# Patient Record
Sex: MALE | Race: ASIAN | URBAN - METROPOLITAN AREA
[De-identification: names, ages, dates, MRNs, and addresses within clinical notes are randomized per-mention and may not be internally consistent; named-entity substitution may affect disease eponyms.]

---

## 2017-02-02 ENCOUNTER — HOSPITAL ENCOUNTER (EMERGENCY)
Facility: CLINIC | Age: 32
Discharge: HOME | End: 2017-02-02
Attending: EMERGENCY MEDICINE | Admitting: SURGERY

## 2017-02-02 ENCOUNTER — OFFICE VISIT (OUTPATIENT)
Dept: URGENT CARE | Facility: URGENT CARE | Age: 32
End: 2017-02-02

## 2017-02-02 ENCOUNTER — HOSPITAL ENCOUNTER (OUTPATIENT)
Dept: CT IMAGING | Facility: CLINIC | Age: 32
Discharge: HOME | End: 2017-02-02
Attending: NURSE PRACTITIONER | Admitting: NURSE PRACTITIONER

## 2017-02-02 ENCOUNTER — OFFICE VISIT (OUTPATIENT)
Dept: PEDIATRICS | Facility: CLINIC | Age: 32
End: 2017-02-02

## 2017-02-02 VITALS
WEIGHT: 178 LBS | TEMPERATURE: 97.9 F | BODY MASS INDEX: 28.61 KG/M2 | SYSTOLIC BLOOD PRESSURE: 104 MMHG | DIASTOLIC BLOOD PRESSURE: 64 MMHG | HEIGHT: 66 IN | OXYGEN SATURATION: 98 % | HEART RATE: 81 BPM

## 2017-02-02 VITALS
HEART RATE: 75 BPM | SYSTOLIC BLOOD PRESSURE: 100 MMHG | HEIGHT: 68 IN | TEMPERATURE: 98.5 F | DIASTOLIC BLOOD PRESSURE: 56 MMHG | OXYGEN SATURATION: 98 % | BODY MASS INDEX: 27.68 KG/M2 | WEIGHT: 182.6 LBS

## 2017-02-02 VITALS
HEART RATE: 82 BPM | OXYGEN SATURATION: 100 % | SYSTOLIC BLOOD PRESSURE: 107 MMHG | TEMPERATURE: 98 F | RESPIRATION RATE: 16 BRPM | DIASTOLIC BLOOD PRESSURE: 71 MMHG

## 2017-02-02 DIAGNOSIS — R10.33 PERIUMBILICAL ABDOMINAL PAIN: Primary | ICD-10-CM

## 2017-02-02 DIAGNOSIS — K57.12 DIVERTICULITIS OF SMALL INTESTINE WITHOUT PERFORATION OR ABSCESS WITHOUT BLEEDING: ICD-10-CM

## 2017-02-02 DIAGNOSIS — R10.84 ABDOMINAL PAIN, GENERALIZED: ICD-10-CM

## 2017-02-02 DIAGNOSIS — R10.84 ABDOMINAL PAIN, GENERALIZED: Primary | ICD-10-CM

## 2017-02-02 LAB
ALBUMIN SERPL-MCNC: 4 G/DL (ref 3.4–5)
ALBUMIN UR-MCNC: NEGATIVE MG/DL
ALP SERPL-CCNC: 57 U/L (ref 40–150)
ALT SERPL W P-5'-P-CCNC: 43 U/L (ref 0–70)
ANION GAP SERPL CALCULATED.3IONS-SCNC: 2 MMOL/L (ref 3–14)
APPEARANCE UR: CLEAR
AST SERPL W P-5'-P-CCNC: 27 U/L (ref 0–45)
BASOPHILS # BLD AUTO: 0 10E9/L (ref 0–0.2)
BASOPHILS NFR BLD AUTO: 0.2 %
BILIRUB SERPL-MCNC: 1.4 MG/DL (ref 0.2–1.3)
BILIRUB UR QL STRIP: NEGATIVE
BUN SERPL-MCNC: 10 MG/DL (ref 7–30)
CALCIUM SERPL-MCNC: 9.6 MG/DL (ref 8.5–10.1)
CHLORIDE SERPL-SCNC: 108 MMOL/L (ref 94–109)
CO2 SERPL-SCNC: 30 MMOL/L (ref 20–32)
COLOR UR AUTO: YELLOW
CREAT SERPL-MCNC: 1 MG/DL (ref 0.66–1.25)
DIFFERENTIAL METHOD BLD: ABNORMAL
EOSINOPHIL # BLD AUTO: 0.1 10E9/L (ref 0–0.7)
EOSINOPHIL NFR BLD AUTO: 0.6 %
ERYTHROCYTE [DISTWIDTH] IN BLOOD BY AUTOMATED COUNT: 11.9 % (ref 10–15)
ERYTHROCYTE [SEDIMENTATION RATE] IN BLOOD BY WESTERGREN METHOD: 12 MM/H (ref 0–15)
GFR SERPL CREATININE-BSD FRML MDRD: 87 ML/MIN/1.7M2
GLUCOSE SERPL-MCNC: 128 MG/DL (ref 70–99)
GLUCOSE UR STRIP-MCNC: NEGATIVE MG/DL
HCT VFR BLD AUTO: 45.2 % (ref 40–53)
HGB BLD-MCNC: 14.8 G/DL (ref 13.3–17.7)
HGB UR QL STRIP: NEGATIVE
KETONES UR STRIP-MCNC: NEGATIVE MG/DL
LEUKOCYTE ESTERASE UR QL STRIP: NEGATIVE
LIPASE SERPL-CCNC: 47 U/L (ref 73–393)
LYMPHOCYTES # BLD AUTO: 1.7 10E9/L (ref 0.8–5.3)
LYMPHOCYTES NFR BLD AUTO: 13.6 %
MCH RBC QN AUTO: 29.6 PG (ref 26.5–33)
MCHC RBC AUTO-ENTMCNC: 32.7 G/DL (ref 31.5–36.5)
MCV RBC AUTO: 90 FL (ref 78–100)
MONOCYTES # BLD AUTO: 1.3 10E9/L (ref 0–1.3)
MONOCYTES NFR BLD AUTO: 10.4 %
NEUTROPHILS # BLD AUTO: 9.2 10E9/L (ref 1.6–8.3)
NEUTROPHILS NFR BLD AUTO: 75.2 %
NITRATE UR QL: NEGATIVE
PH UR STRIP: 6 PH (ref 5–7)
PLATELET # BLD AUTO: 256 10E9/L (ref 150–450)
POTASSIUM SERPL-SCNC: 4.2 MMOL/L (ref 3.4–5.3)
PROT SERPL-MCNC: 8.3 G/DL (ref 6.8–8.8)
RBC # BLD AUTO: 5 10E12/L (ref 4.4–5.9)
SODIUM SERPL-SCNC: 140 MMOL/L (ref 133–144)
SP GR UR STRIP: 1.02 (ref 1–1.03)
URN SPEC COLLECT METH UR: NORMAL
UROBILINOGEN UR STRIP-ACNC: 0.2 EU/DL (ref 0.2–1)
WBC # BLD AUTO: 12.3 10E9/L (ref 4–11)

## 2017-02-02 PROCEDURE — 96365 THER/PROPH/DIAG IV INF INIT: CPT

## 2017-02-02 PROCEDURE — 99214 OFFICE O/P EST MOD 30 MIN: CPT | Performed by: NURSE PRACTITIONER

## 2017-02-02 PROCEDURE — 25000128 H RX IP 250 OP 636: Performed by: EMERGENCY MEDICINE

## 2017-02-02 PROCEDURE — 25500064 ZZH RX 255 OP 636: Performed by: RADIOLOGY

## 2017-02-02 PROCEDURE — 74177 CT ABD & PELVIS W/CONTRAST: CPT

## 2017-02-02 PROCEDURE — 83690 ASSAY OF LIPASE: CPT | Performed by: NURSE PRACTITIONER

## 2017-02-02 PROCEDURE — 99212 OFFICE O/P EST SF 10 MIN: CPT

## 2017-02-02 PROCEDURE — 85652 RBC SED RATE AUTOMATED: CPT | Performed by: NURSE PRACTITIONER

## 2017-02-02 PROCEDURE — 81003 URINALYSIS AUTO W/O SCOPE: CPT | Performed by: NURSE PRACTITIONER

## 2017-02-02 PROCEDURE — 36415 COLL VENOUS BLD VENIPUNCTURE: CPT | Performed by: NURSE PRACTITIONER

## 2017-02-02 PROCEDURE — 99284 EMERGENCY DEPT VISIT MOD MDM: CPT | Mod: 25

## 2017-02-02 PROCEDURE — 80053 COMPREHEN METABOLIC PANEL: CPT | Performed by: NURSE PRACTITIONER

## 2017-02-02 PROCEDURE — 25000128 H RX IP 250 OP 636: Performed by: RADIOLOGY

## 2017-02-02 PROCEDURE — 85025 COMPLETE CBC W/AUTO DIFF WBC: CPT | Performed by: NURSE PRACTITIONER

## 2017-02-02 PROCEDURE — 96375 TX/PRO/DX INJ NEW DRUG ADDON: CPT

## 2017-02-02 RX ORDER — KETOROLAC TROMETHAMINE 30 MG/ML
30 INJECTION, SOLUTION INTRAMUSCULAR; INTRAVENOUS ONCE
Status: COMPLETED | OUTPATIENT
Start: 2017-02-02 | End: 2017-02-02

## 2017-02-02 RX ORDER — TRAMADOL HYDROCHLORIDE 50 MG/1
50 TABLET ORAL EVERY 6 HOURS PRN
Qty: 20 TABLET | Refills: 0 | Status: SHIPPED | OUTPATIENT
Start: 2017-02-02

## 2017-02-02 RX ORDER — ERTAPENEM 1 G/1
1 INJECTION, POWDER, LYOPHILIZED, FOR SOLUTION INTRAMUSCULAR; INTRAVENOUS ONCE
Status: COMPLETED | OUTPATIENT
Start: 2017-02-02 | End: 2017-02-02

## 2017-02-02 RX ORDER — IBUPROFEN 600 MG/1
600 TABLET, FILM COATED ORAL EVERY 6 HOURS PRN
Qty: 30 TABLET | Refills: 1 | Status: SHIPPED | OUTPATIENT
Start: 2017-02-02 | End: 2017-02-02

## 2017-02-02 RX ORDER — IOPAMIDOL 755 MG/ML
500 INJECTION, SOLUTION INTRAVASCULAR ONCE
Status: COMPLETED | OUTPATIENT
Start: 2017-02-02 | End: 2017-02-02

## 2017-02-02 RX ORDER — IBUPROFEN 600 MG/1
600 TABLET, FILM COATED ORAL EVERY 6 HOURS PRN
Qty: 30 TABLET | Refills: 1 | Status: SHIPPED | OUTPATIENT
Start: 2017-02-02

## 2017-02-02 RX ORDER — SODIUM CHLORIDE 9 MG/ML
1000 INJECTION, SOLUTION INTRAVENOUS CONTINUOUS
Status: DISCONTINUED | OUTPATIENT
Start: 2017-02-02 | End: 2017-02-02 | Stop reason: HOSPADM

## 2017-02-02 RX ORDER — LIDOCAINE 40 MG/G
CREAM TOPICAL
Status: DISCONTINUED | OUTPATIENT
Start: 2017-02-02 | End: 2017-02-02 | Stop reason: HOSPADM

## 2017-02-02 RX ADMIN — KETOROLAC TROMETHAMINE 30 MG: 30 INJECTION, SOLUTION INTRAMUSCULAR at 16:34

## 2017-02-02 RX ADMIN — ERTAPENEM SODIUM 1 G: 1 INJECTION, POWDER, LYOPHILIZED, FOR SOLUTION INTRAMUSCULAR; INTRAVENOUS at 16:31

## 2017-02-02 RX ADMIN — IOPAMIDOL 92 ML: 755 INJECTION, SOLUTION INTRAVENOUS at 14:37

## 2017-02-02 RX ADMIN — SODIUM CHLORIDE 64 ML: 9 INJECTION, SOLUTION INTRAVENOUS at 14:37

## 2017-02-02 ASSESSMENT — ENCOUNTER SYMPTOMS
ABDOMINAL PAIN: 1
DIARRHEA: 0

## 2017-02-02 NOTE — ED AVS SNAPSHOT
[facility] Emergency Department    [address]    [facility] [address]    Phone:  [phone]    Fax:  [phone]                                       Lalo Fulton   MRN: [id number]    Department:  [facility] Emergency Department   Date of Visit:  2/2/2017           Patient Information     Date Of Birth          1985        Your diagnoses for this visit were:     Diverticulitis of small intestine without perforation or abscess without bleeding        You were seen by Merrill Ann MD.      Follow-up Information     Follow up with Nikolas Melgoza MD. Schedule an appointment as soon as possible for a visit in 3 days.    Specialty:  General Surgery    Why:  As needed    Contact information:    [facility]  [address]  [facility] [address]  [phone]          Discharge Instructions       We    We have discussed the fact that your CT scan shows inflammation at the base of the right colon. This is the area of the appendix, but we are not sure if this is your appendix or not. You have elected not to go through surgery, but if the pain gets worse you may have to.    Please return to the ER with fever greater than 100.4, or sevre increase in pain.    Please follow up with Dr. Nikolas Melgoza on Monday or Tuesday.    Diverticulitis    Some people get pouches along the wall of the colon as they get older. The pouches, called diverticuli, usually cause no symptoms. If the pouches become blocked, you can get an infection. This infection is called diverticulitis. It causes pain in your lower abdomen and fever. If not treated, it can become a serious condition, causing an abscess to form inside the pouch. The abscess may block the intestinal tract even or rupture, spreading infection throughout the abdomen.  When treatment is started early, oral antibiotics alone may be enough to cure diverticulitis. This method is tried first. But, if you  don't improve or if your condition gets worse while you are trying oral antibiotics, you may need to be admitted to the hospital for IV antibiotics. Severe cases may require surgery.  Home care  The following guidelines will help you care for yourself at home:    During the acute illness, rest and follow a low-fiber diet. Include foods like:    Flake cereal, mashed potatoes, pancakes, waffles, pasta, white bread, rice, applesauce, bananas, eggs, meat, fish, poultry, tofu, and cooked vegetables    Take antibiotics exactly as the doctor says. Don't miss any doses or stop taking the medication, even if you feel better.    Monitor your temperature and report any rising temperatures to your doctor.  Preventing future attacks  Once you have had an episode of diverticulitis, you are at risk for having it again. After you have recovered from this episode, you may be able to reduce your risk by eating a high-fiber diet (20-35 gm/day of fiber). This cleans out the colon pouches that already exist and prevents new ones from forming. Foods high in fiber include fresh fruits and edible peelings, raw or lightly cooked vegetables, whole grain cereals and breads, dried beans and peas, and bran.  Other steps that can help prevent future attacks include:    Take your medications, such as antibiotics, as the doctor says.    Drink 6 to 8 glasses of water every day, unless directed otherwise.    Use a heating pad or hot water bottle to reduce abdominal cramping or pain.    Begin an exercise program. Ask your doctor how to get started. You can benefit from simple activities such as walking or gardening.    Treat diarrhea with a bland diet. Start with liquids only; then slowly add fiber over time.    Watch for changes in your bowel movements (constipation to diarrhea).    Get plenty of rest and sleep.  Follow-up care  Follow up with your doctor as advised or sooner if you are not getting better in the next 2 days.  When to seek medical  care  Get prompt medical attention if any of the following occur:    Fever of 100.4 F (38 C) or higher, or as directed by your health care provider    Repeated vomiting or swelling of the abdomen    Weakness, dizziness, light-headedness    Pain in your abdomen that gets worse, severe, or spreads to your back    Pain that moves to the right lower abdomen    Rectal bleeding (stools that are red, black or maroon color)    Unexpected vaginal bleeding    9085-7441 [employer]. [address] [place] [address]. All rights reserved. This information is not intended as a substitute for professional medical care. Always follow your healthcare professional's instructions.          24 Hour Appointment Hotline       To make an appointment at any [facility] clinic, call [phone] ([phone]). If you don't have a family doctor or clinic, we will help you find one. [facility] clinics are conveniently located to serve the needs of you and your family.             Review of your medicines      START taking        Dose / Directions Last dose taken    amoxicillin-clavulanate 875-125 MG per tablet   Commonly known as:  AUGMENTIN   Dose:  1 tablet   Quantity:  20 tablet        Take 1 tablet by mouth 2 times daily   Refills:  0        ibuprofen 600 MG tablet   Commonly known as:  ADVIL/MOTRIN   Dose:  600 mg   Quantity:  30 tablet        Take 1 tablet (600 mg) by mouth every 6 hours as needed for moderate pain   Refills:  1        traMADol 50 MG tablet   Commonly known as:  ULTRAM   Dose:  50 mg   Quantity:  20 tablet        Take 1 tablet (50 mg) by mouth every 6 hours as needed for pain maximum 6 tablet(s) per day   Refills:  0                Prescriptions were sent or printed at these locations (3 Prescriptions)                   [facility] [address] [address] [address]    Telephone:  [phone]   Fax:  [phone]   Hours:                   Printed at Department/Unit printer (1 of 3)         traMADol (ULTRAM) 50 MG tablet                 Printed at [facility] (2 of 3)         amoxicillin-clavulanate (AUGMENTIN) 875-125 MG per tablet               ibuprofen (ADVIL/MOTRIN) 600 MG tablet                Procedures and tests performed during your visit     Peripheral IV: Standard      Orders Needing Specimen Collection     None      Pending Results     Date and Time Order Name Status Description    2/2/2017 1103 LIPASE In process             Pending Culture Results     No orders found from 2/1/2017 to 2/3/2017.             Test Results from your hospital stay            Clinical Quality Measure: Blood Pressure Screening     Your blood pressure was checked while you were in the emergency department today. The last reading we obtained was  BP: 103/67 mmHg . Please read the guidelines below about what these numbers mean and what you should do about them.  If your systolic blood pressure (the top number) is less than 120 and your diastolic blood pressure (the bottom number) is less than 80, then your blood pressure is normal. There is nothing more that you need to do about it.  If your systolic blood pressure (the top number) is 120-139 or your diastolic blood pressure (the bottom number) is 80-89, your blood pressure may be higher than it should be. You should have your blood pressure rechecked within a year by a primary care provider.  If your systolic blood pressure (the top number) is 140 or greater or your diastolic blood pressure (the bottom number) is 90 or greater, you may have high blood pressure. High blood pressure is treatable, but if left untreated over time it can put you at risk for heart attack, stroke, or kidney failure. You should have your blood pressure rechecked by a primary care provider within the next 4 weeks.  If your provider in the emergency department today gave you specific instructions to  "follow-up with your doctor or provider even sooner than that, you should follow that instruction and not wait for up to 4 weeks for your follow-up visit.        Thank you for choosing [facility]       Thank you for choosing [facility] for your care. Our goal is always to provide you with excellent care. Hearing back from our patients is one way we can continue to improve our services. Please take a few minutes to complete the written survey that you may receive in the mail after you visit with us. Thank you!        [employer] Information     [employer] lets you send messages to your doctor, view your test results, renew your prescriptions, schedule appointments and more. To sign up, go to [email or web address] . Click on \"Log in\" on the left side of the screen, which will take you to the Welcome page. Then click on \"Sign up Now\" on the right side of the page.     You will be asked to enter the access code listed below, as well as some personal information. Please follow the directions to create your username and password.     Your access code is: [email or web address]  Expires: 5/3/2017  5:08 PM     Your access code will  in 90 days. If you need help or a new code, please call your [facility] clinic or [phone].        Care EveryWhere ID     This is your Care EveryWhere ID. This could be used by other organizations to access your [facility] medical records  [id number]        After Visit Summary       This is your record. Keep this with you and show to your community pharmacist(s) and doctor(s) at your next visit.                  "

## 2017-02-02 NOTE — PATIENT INSTRUCTIONS
"   * ABDOMINAL PAIN, POSSIBLE APPENDICITIS, Repeat Exam, Male    Based on your visit today, the exact cause of your abdominal (stomach) pain is not certain. However, you do have some of the early signs of appendicitis. Early in an appendix infection the symptoms can be similar to a simple \"stomach ache\" or \"stomach flu\". Therefore, the diagnosis can be hard to make. Since an appendix infection is a serious condition, it is important to know if this is the cause of your symptoms.  WAITING for more time to pass and repeating the exam is the best way to find out whether you have appendicitis. Within the next 12-24 hours the cause of your stomach pain should become clear. It is important for you to watch for any new symptoms or worsening of your condition. (See below).  HOME CARE:  1. Rest until your next exam. No strenuous activities.  2. Eat a diet low in fiber (called a low-residue diet). Foods allowed include refined breads, white rice, fruit and vegetable juices without pulp, tender meats. These foods will pass more easily through the intestine.  3. Avoid whole-grain foods, whole fruits and vegetables, meats, seeds and nuts, fried or fatty foods, dairy, alcohol and spicy foods until your symptoms go away.  In some cases, you may be asked not to eat or drink anything until you are re-examined.  4. Return for another exam exactly as directed.  FOLLOW UP with your doctor or this facility as directed.  RETURN PROMPTLY before your next appointment or contact your doctor if any of the following occur:    Pain gets worse or moves to the right lower abdomen    New or worsening vomiting or diarrhea    Swelling of the abdomen    Unable to pass stool for more than three days    New fever over 100.4  F (38  C), or rising fever    Blood in vomit or bowel movements (dark red or black color)    Weakness, dizziness or fainting    [id number] [employer], [address] [place] [address], [place] [address]. All rights reserved. This " "information is not intended as a substitute for professional medical care. Always follow your healthcare professional's instructions.     * ABDOMINAL PAIN, POSSIBLE APPENDICITIS, Repeat Exam, Male    Based on your visit today, the exact cause of your abdominal (stomach) pain is not certain. However, you do have some of the early signs of appendicitis. Early in an appendix infection the symptoms can be similar to a simple \"stomach ache\" or \"stomach flu\". Therefore, the diagnosis can be hard to make. Since an appendix infection is a serious condition, it is important to know if this is the cause of your symptoms.  WAITING for more time to pass and repeating the exam is the best way to find out whether you have appendicitis. Within the next 12-24 hours the cause of your stomach pain should become clear. It is important for you to watch for any new symptoms or worsening of your condition. (See below).  HOME CARE:  5. Rest until your next exam. No strenuous activities.  6. Eat a diet low in fiber (called a low-residue diet). Foods allowed include refined breads, white rice, fruit and vegetable juices without pulp, tender meats. These foods will pass more easily through the intestine.  7. Avoid whole-grain foods, whole fruits and vegetables, meats, seeds and nuts, fried or fatty foods, dairy, alcohol and spicy foods until your symptoms go away.  In some cases, you may be asked not to eat or drink anything until you are re-examined.  8. Return for another exam exactly as directed.  FOLLOW UP with your doctor or this facility as directed.  RETURN PROMPTLY before your next appointment or contact your doctor if any of the following occur:    Pain gets worse or moves to the right lower abdomen    New or worsening vomiting or diarrhea    Swelling of the abdomen    Unable to pass stool for more than three days    New fever over 100.4  F (38  C), or rising fever    Blood in vomit or bowel movements (dark red or black " color)    Weakness, dizziness or fainting    2769-9462 [employer], [address] [place] [address], [place] [address]. All rights reserved. This information is not intended as a substitute for professional medical care. Always follow your healthcare professional's instructions.

## 2017-02-02 NOTE — ED NOTES
Abdominal pain x 2 days- sent from clinic -CT scan showed inflammation of appendic -sent to ED. ABC Intact alert and no distress.       .

## 2017-02-02 NOTE — NURSING NOTE
"Chief Complaint   Patient presents with     Abdominal Pain     possible appendicitis       Initial /56 mmHg  Pulse 75  Temp(Src) 98.5  F (36.9  C) (Tympanic)  Ht 5' 8\" (1.727 m)  Wt 182 lb 9.6 oz (82.827 kg)  BMI 27.77 kg/m2  SpO2 98% Estimated body mass index is 27.77 kg/(m^2) as calculated from the following:    Height as of this encounter: 5' 8\" (1.727 m).    Weight as of this encounter: 182 lb 9.6 oz (82.827 kg).  BP completed using cuff size: mary Cai CMA        "

## 2017-02-02 NOTE — ED PROVIDER NOTES
History     Chief Complaint:  Abdominal Pain      HPI   Lalo Fulton is a 31 year old male who presents to the emergency department for evaluation of abdominal pain. The patient has had continued abdominal pain for the last two days. He notes no significant exacerbating or alleviating factors. The patient was see in clinic for these symptoms this afternoon, where he had a CT Abdomen/Pelvis performed. He was then referred here in the emergency department for further evaluation. He denies any diarrhea and notes that his last bowel movement was this morning, which was normal for him.     CT Abdomen/Pelvis with IV contrast 02/02/2017:   1. Acute inflammatory change of the right lower quadrant that appears  to be primarily centered at the terminal ileum involving a  diverticulum. This could represent a small bowel diverticulitis,  including Meckel's diverticulitis. The differential also includes  active inflammatory bowel disease, with the inflamed tract possibly  representing a small forming fistula. The appendix is borderline  enlarged and may have some reactive inflammation as it lies near the  inflamed terminal ileum.  2. Trace fluid at the right lower quadrant, but no abscess or free  air.  3. Mild wall thickening of the bladder. Correlate with urinalysis to  assess for any urinary tract infection, including cystitis. Nodule  anterior to the bladder could be a borderline prominent lymph node.  4. Multiple intrarenal stones bilaterally. No hydronephrosis or ureter  stone. As per radiology.      Allergies:  NKDA     Medications:    The patient is currently on no regular medications.     Past Medical History:    The patient denies any significant past medical history.     Past Surgical History:    The patient does not have any pertinent past surgical history.     Family History:    diabetes mellitus  hypertension     Social History:  Presents with his family member.  Negative for tobacco use.  Positive for alcohol  use, 2 drinks weekly   Marital Status:  Single [1]    Review of Systems   Gastrointestinal: Positive for abdominal pain. Negative for diarrhea.   All other systems reviewed and are negative.    Physical Exam     Patient Vitals for the past 24 hrs:   BP Temp Temp src Pulse Resp SpO2   02/02/17 1717 107/71 mmHg - - - 16 100 %   02/02/17 1711 - - - - - 98 %   02/02/17 1645 - - - - - 98 %   02/02/17 1630 - - - - - 99 %   02/02/17 1615 - - - - - 99 %   02/02/17 1600 104/67 mmHg - - - - 98 %   02/02/17 1545 111/71 mmHg - - - - 98 %   02/02/17 1540 103/67 mmHg 98  F (36.7  C) Oral 82 20 100 %       Physical Exam   Constitutional: He is oriented to person, place, and time. He appears well-developed.   HENT:   Head: Normocephalic and atraumatic.   Right Ear: External ear normal.   Mouth/Throat: Oropharynx is clear and moist.   Eyes: Conjunctivae and EOM are normal. Pupils are equal, round, and reactive to light.   Neck: Normal range of motion. Neck supple. No JVD present.   Cardiovascular: Normal rate, regular rhythm and normal heart sounds.    Pulmonary/Chest: Effort normal and breath sounds normal.   Abdominal: Soft. Bowel sounds are normal. He exhibits no distension. There is tenderness in the right lower quadrant. There is no rebound.       Musculoskeletal: Normal range of motion.   Lymphadenopathy:     He has no cervical adenopathy.   Neurological: He is alert and oriented to person, place, and time. He displays normal reflexes. No cranial nerve deficit. He exhibits normal muscle tone. Coordination normal.   Skin: Skin is warm and dry.   Psychiatric: He has a normal mood and affect. His behavior is normal. Judgment normal.   Nursing note and vitals reviewed.        Emergency Department Course   Interventions:  1631 Invanz 1 g IV  1634 Toradol, 30 mg, IV injection    Emergency Department Course:  Nursing notes and vitals reviewed. I performed an exam of the patient as documented above.     1601 I consulted with   Rhona, general surgery, regarding the patient's history and presentation here in the emergency department.    1608 I rechecked the patient and discussed Dr. Melgoza's recommendations. He states that he refusing surgery at this time.    1617 I again spoke with Dr. Melgoza and discussed the patient's wishes to forego surgery at this time.     IV inserted. Medicine administered as documented above.      Findings and plan explained to the Patient. Patient discharged home with instructions regarding supportive care, medications, and reasons to return. The importance of close follow-up was reviewed. The patient was prescribed Tramadol, Augmentin, and ibuprofen.    Impression & Plan    Medical Decision Making:  Lalo Fulton is a 31 year old male who presents with right lower quadrant pain. Patient was seen in the clinic and referred to the emergency department due to abnormal CT scan. Care was discussed with Dr. Mukesh Melgoza. Clinically, patient has pain in right lower quadrant. Patient is well appearing and is not febrile. Patient was initiated offered laparoscopic investigation of appendicitis versus diverticulitis verus Meckel's diverticulitis. Patient refused surgery. Care was again dicussed with Dr. Melgoza and recommendation was for antibiotics, follow up closely in clinic, and return for worsening condition and would recommend laparoscopic evaluation.     Diagnosis:  1. Right lower quadrant pain  2. Suspect Diverticulitis of small intestine without perforation or abscess without bleeding (K57.12)    Disposition:  discharged to home. Augmentin at home X2 weeks. Follow up with Dr. Melgoza.     Discharge Medications:  Tramadol, Augmentin, and ibuprofen       IAshley, am serving as a scribe on 2/2/2017 at 3:33 PM to personally document services performed by Merrill Ann MD based on my observations and the provider's statements to me.     Ashley Hussein  2/2/2017   [facility] EMERGENCY  DEPARTMENT        Merrill Ann MD  02/03/17 2000

## 2017-02-02 NOTE — PROGRESS NOTES
"  SUBJECTIVE:                                                    Lalo Fulton is a 31 year old male who presents to clinic today for the following health issues:    Abdominal Pain      Duration: 2 days    Description (location/character/radiation): middle abdominal pain, waxing & waning for 2 days       Associated flank pain: lower back pain    Intensity:  Mild to moderate, 6/10    Accompanying signs and symptoms:        Fever/Chills: YES- last night fever, sweats       Gas/Bloating: YES- gas       Nausea/vomitting: YES- nausea & dry mouth       Diarrhea: no        Dysuria or Hematuria: no     History (previous similar pain/trauma/previous testing): None    Precipitating or alleviating factors:       Pain worse with eating/BM/urination: No       Pain relieved by BM: no     Therapies tried and outcome: nyquil    LMP:  not applicable     2 months ago had pain like this. Had 2 hours of pain like this only it was worse, 8-9/10.  Sx went away.    Now he has ju-umbilical pain, occasionally radiating to the RLQ. Also rarely radiating to the midline low back. No dysuria, hematuria, or stones in the urine. Has nausea but no vomiting. No stool changes. Has been passing gas and stool normally. Felt feverish last night. No URI sx or cough. Has never had an abdominal surgery before.     ROS: const/gi/gu/heent/resp otherwise negative     OBJECTIVE:  /56 mmHg  Pulse 75  Temp(Src) 98.5  F (36.9  C) (Tympanic)  Ht 5' 8\" (1.727 m)  Wt 182 lb 9.6 oz (82.827 kg)  BMI 27.77 kg/m2  SpO2 98%  CONSTITUTIONAL: Alert, well-nourished, well-groomed, NAD  RESP: Lungs CTA. No wheeze, rhonchi, rales.  CV: HRRR S1 S2 No MRG. No peripheral edema  GI: Abdomen flat. BS x 4. Exquisite TTP RLQ and ju-umbilical regions. No HSM or masses. No CVAT  HEENT: Eyes: Conjunctiva pink and moist. Ears: Ear canals unremarkable. TMs pearly gray bilaterally. Bony landmarks and light reflexes intact. No erythema. Nose: Turbinates pink and moist. " Throat: OP pink and moist. No tonsillar enlargement or exudates. No postnasal drip.  Neck: No lymphadenopathy or masses. Thyroid smooth, non-tender, and non-enlarged.      ASSESSMENT/PLAN:  (R10.84) Abdominal pain, generalized  Comment: Patient with 5/10 ju-umbilical/RLQ pain x 2 days associated with nausea, subjective intermittent fever, and leukocytosis with a left shift. No vomiting, diarrhea, or stool changes. He is passing gas and has no history of abdominal surgeries. UA is clear. No signs of kidney infection or stone. Most likely diagnosis is appendicitis.   Plan: Referred for urgent CT. Advised him to stay NPO.  Will call him immediately after CT.      Le Diez, ISAIAH-JOS.

## 2017-02-02 NOTE — PROGRESS NOTES
No Charge UC Triage to PCP Le Diez after phone discussion with Le. Patient verbalizes he does not want to go to ER due to lack of medical insurance.     Glory-umbilical pain and subjective fever x 2 days. Positive nausea. Positive for decreased appetite.  Positive radiation of pain in low back. No vomiting or diarrhea.  Normal BM x 2 today (no blood).    Denies any past hx of PUD. Denies any past GI surgery.     PLAN:     Discussed with Le.  She has graciously agreed to evaluate Lalo today. I am suspicious at this point for potential appendicitis. Le will order labs and do a full exam in day clinic.  If she feels need to r/o appendicitis, she will coordinate advanced imaging.

## 2017-02-02 NOTE — ED AVS SNAPSHOT
[facility] Emergency Department    [address]    [facility] [address]    Phone:  [phone]    Fax:  [phone]                                       Lalo Fulton   MRN: [id number]    Department:  [facility] Emergency Department   Date of Visit:  2/2/2017           After Visit Summary Signature Page     I have received my discharge instructions, and my questions have been answered. I have discussed any challenges I see with this plan with the nurse or doctor.    ..........................................................................................................................................  Patient/Patient Representative Signature      ..........................................................................................................................................  Patient Representative Print Name and Relationship to Patient    ..................................................               ................................................  Date                                            Time    ..........................................................................................................................................  Reviewed by Signature/Title    ...................................................              ..............................................  Date                                                            Time

## 2017-02-02 NOTE — DISCHARGE INSTRUCTIONS
We    We have discussed the fact that your CT scan shows inflammation at the base of the right colon. This is the area of the appendix, but we are not sure if this is your appendix or not. You have elected not to go through surgery, but if the pain gets worse you may have to.    Please return to the ER with fever greater than 100.4, or sevre increase in pain.    Please follow up with Dr. Nikolas Melgoza on Monday or Tuesday.    Diverticulitis    Some people get pouches along the wall of the colon as they get older. The pouches, called diverticuli, usually cause no symptoms. If the pouches become blocked, you can get an infection. This infection is called diverticulitis. It causes pain in your lower abdomen and fever. If not treated, it can become a serious condition, causing an abscess to form inside the pouch. The abscess may block the intestinal tract even or rupture, spreading infection throughout the abdomen.  When treatment is started early, oral antibiotics alone may be enough to cure diverticulitis. This method is tried first. But, if you don't improve or if your condition gets worse while you are trying oral antibiotics, you may need to be admitted to the hospital for IV antibiotics. Severe cases may require surgery.  Home care  The following guidelines will help you care for yourself at home:    During the acute illness, rest and follow a low-fiber diet. Include foods like:    Flake cereal, mashed potatoes, pancakes, waffles, pasta, white bread, rice, applesauce, bananas, eggs, meat, fish, poultry, tofu, and cooked vegetables    Take antibiotics exactly as the doctor says. Don't miss any doses or stop taking the medication, even if you feel better.    Monitor your temperature and report any rising temperatures to your doctor.  Preventing future attacks  Once you have had an episode of diverticulitis, you are at risk for having it again. After you have recovered from this episode, you may be able to reduce  your risk by eating a high-fiber diet (20-35 gm/day of fiber). This cleans out the colon pouches that already exist and prevents new ones from forming. Foods high in fiber include fresh fruits and edible peelings, raw or lightly cooked vegetables, whole grain cereals and breads, dried beans and peas, and bran.  Other steps that can help prevent future attacks include:    Take your medications, such as antibiotics, as the doctor says.    Drink 6 to 8 glasses of water every day, unless directed otherwise.    Use a heating pad or hot water bottle to reduce abdominal cramping or pain.    Begin an exercise program. Ask your doctor how to get started. You can benefit from simple activities such as walking or gardening.    Treat diarrhea with a bland diet. Start with liquids only; then slowly add fiber over time.    Watch for changes in your bowel movements (constipation to diarrhea).    Get plenty of rest and sleep.  Follow-up care  Follow up with your doctor as advised or sooner if you are not getting better in the next 2 days.  When to seek medical care  Get prompt medical attention if any of the following occur:    Fever of 100.4 F (38 C) or higher, or as directed by your health care provider    Repeated vomiting or swelling of the abdomen    Weakness, dizziness, light-headedness    Pain in your abdomen that gets worse, severe, or spreads to your back    Pain that moves to the right lower abdomen    Rectal bleeding (stools that are red, black or maroon color)    Unexpected vaginal bleeding    6980-7887 [employer]. [address] [place] [address], [place] [address]. All rights reserved. This information is not intended as a substitute for professional medical care. Always follow your healthcare professional's instructions.

## 2017-02-03 ENCOUNTER — TELEPHONE (OUTPATIENT)
Dept: PEDIATRICS | Facility: CLINIC | Age: 32
End: 2017-02-03

## 2017-02-03 NOTE — TELEPHONE ENCOUNTER
Patient seen in ED yesterday after my office visit for diverticulitis of the SMALL intestine and (possible but very unlikely) appendicitis. He started antibiotics but refused surgery because he doesn't have insurance.     I'm very concerned about him.    Please have him eat a bland if not liquid diet (ask if the ER doc told him any specific diet) until he is feeling better.    Also, please ask for an update on how he is feeling  -nausea  -pain  -fever  -stool changes.    Please let him know he needs to go back to the ER if he has worsening abdominal pain, persistent vomiting, blood in his stool, etc.

## 2017-02-03 NOTE — TELEPHONE ENCOUNTER
Called patient and he is no longer having pain or nausea or fever. States his stools are slightly watery but no blood in the stool. He will watch for any changes or worsening or return of symptoms and will return to the ER if he has recurrence. States understanding with bland diet.   Diann Harris RN

## 2017-02-06 ENCOUNTER — OFFICE VISIT (OUTPATIENT)
Dept: SURGERY | Facility: CLINIC | Age: 32
End: 2017-02-06

## 2017-02-06 VITALS
SYSTOLIC BLOOD PRESSURE: 128 MMHG | WEIGHT: 182 LBS | HEART RATE: 72 BPM | DIASTOLIC BLOOD PRESSURE: 72 MMHG | BODY MASS INDEX: 27.58 KG/M2 | HEIGHT: 68 IN

## 2017-02-06 DIAGNOSIS — K57.12 DIVERTICULITIS OF SMALL INTESTINE WITHOUT PERFORATION OR ABSCESS WITHOUT BLEEDING: Primary | ICD-10-CM

## 2017-02-06 PROCEDURE — 99203 OFFICE O/P NEW LOW 30 MIN: CPT | Performed by: SURGERY

## 2017-02-06 NOTE — NURSING NOTE
"Chief Complaint   Patient presents with     Consult     appendix, referred by ER       Initial /72 mmHg  Pulse 72  Ht 5' 8\" (1.727 m)  Wt 182 lb (82.555 kg)  BMI 27.68 kg/m2 Estimated body mass index is 27.68 kg/(m^2) as calculated from the following:    Height as of this encounter: 5' 8\" (1.727 m).    Weight as of this encounter: 182 lb (82.555 kg).  Medication Reconciliation: complete   Yumiko Castro MA      "

## 2017-02-06 NOTE — Clinical Note
2017    RE: Lalo Fulton-:  83    Chief complaint:  Abdominal pain, right lower quadrant    HPI:  This patient is a 31 year old male who Recently presented to the emergency room with right lower quadrant abdominal pain.  CT scan of the abdomen revealed inflammation in the right lower quadrant.  This appeared to be centered around a small bowel diverticulum, rather than the appendix.   I spoke with the ER physician by phone, and recommended laparoscopy with probable appendectomy.  The patient declined surgery, and was sent home on oral antibiotics.  He did receive a single dose of Invanz in the emergency room.  The patient now has no pain, and has felt fine since the day after his emergency room visit.   The patient denies any blood per rectum.  He does have some long-standing spicy food intolerance, has fairly frequent bowel movements and is not sure he digests his food well.  None of these symptoms seemed to be related to his right lower quadrant pain.    Past Medical History:  Has no past medical history on file.    Review of Systems:  The 10 point Review of Systems is negative other than noted in the HPI and above.    Physical Exam:  General - This is a well developed, well nourished male in no apparent distress.  HEENT - Normocephalic, atraumatic.  No scleral icterus.  Lungs - Breathing is nonlabored  Abdomen:   soft, non-distended and nontender  Extremities - warm without edema  Neurologic - nonfocal    Relevant labs:  White blood cell count was 12,300 during his recent emergency room visit.    Imaging:  CT scan revealed inflammatory change around an apparent small bowel diverticulum.  This appeared to be separate from the appendix, which was mildly enlarged.  The possibility of Crohn's disease was also raised by the radiologist.    Assessment and Plan:  This is a patient with some sort of inflammatory process in the right lower quadrant.  This could conceivably represent a small bowel  diverticulitis or a right-sided diverticulitis.  This could be related to a Meckel's diverticulum, or conceivably Crohn's disease.  It seems unlikely that this is appendicitis.  The patient has had near-complete resolution of his symptoms with antibiotics.  I have recommended that the patient continue his antibiotic course.  If the patient has recurrence of his symptoms when his antibiotics stopped, surgical intervention would be appropriate.  At this point, I see no reason to recommend any sort of urgent surgery.  I do think it might be worth having the patient see a gastroenterologist, given his long-standing GI complaints.  The patient certainly could have repeat CT scan or an upper GI with small bowel follow-through to try to further delineate his diagnosis, but it is certainly reasonable to try simple antibiotic treatment given the patient's current lack of insurance.  The patient understands that if he has recurrence of his symptoms, he needs to seek urgent assistance in the emergency room.  I explained to the patient that I am happy to see him back at any time if he would like further evaluation from a surgical standpoint.    Nikolas Melgoza MD  [facility]

## 2017-02-06 NOTE — PROGRESS NOTES
Chief complaint:  Abdominal pain, right lower quadrant    HPI:  This patient is a 31 year old male who Recently presented to the emergency room with right lower quadrant abdominal pain.  CT scan of the abdomen revealed inflammation in the right lower quadrant.  This appeared to be centered around a small bowel diverticulum, rather than the appendix.   I spoke with the ER physician by phone, and recommended laparoscopy with probable appendectomy.  The patient declined surgery, and was sent home on oral antibiotics.  He did receive a single dose of Invanz in the emergency room.  The patient now has no pain, and has felt fine since the day after his emergency room visit.   The patient denies any blood per rectum.  He does have some long-standing spicy food intolerance, has fairly frequent bowel movements and is not sure he digests his food well.  None of these symptoms seemed to be related to his right lower quadrant pain.    Past Medical History:   has no past medical history on file.    Past Surgical History:  Past Surgical History   Procedure Laterality Date     No history of surgery          Social History:  Social History     Social History     Marital Status: Single     Spouse Name: N/A     Number of Children: N/A     Years of Education: N/A     Occupational History     Not on file.     Social History Main Topics     Smoking status: Never Smoker      Smokeless tobacco: Never Used     Alcohol Use: 0.0 oz/week     0 Standard drinks or equivalent per week      Comment: 1 times per week, 2 drinks per time     Drug Use: No     Sexual Activity:     Partners: Female     Other Topics Concern     Not on file     Social History Narrative        Family History:  Family History   Problem Relation Age of Onset     DIABETES Mother      Hypertension Mother        Review of Systems:  The 10 point Review of Systems is negative other than noted in the HPI and above.    Physical Exam:  General - This is a well developed, well  nourished male in no apparent distress.  HEENT - Normocephalic, atraumatic.  No scleral icterus.  Lungs - Breathing is nonlabored  Abdomen:   soft, non-distended and nontender  Extremities - warm without edema  Neurologic - nonfocal    Relevant labs:  White blood cell count was 12,300 during his recent emergency room visit.    Imaging:  CT scan revealed inflammatory change around an apparent small bowel diverticulum.  This appeared to be separate from the appendix, which was mildly enlarged.  The possibility of Crohn's disease was also raised by the radiologist.    Assessment and Plan:  This is a patient with some sort of inflammatory process in the right lower quadrant.  This could conceivably represent a small bowel diverticulitis or a right-sided diverticulitis.  This could be related to a Meckel's diverticulum, or conceivably Crohn's disease.  It seems unlikely that this is appendicitis.  The patient has had near-complete resolution of his symptoms with antibiotics.  I have recommended that the patient continue his antibiotic course.  If the patient has recurrence of his symptoms when his antibiotics stopped, surgical intervention would be appropriate.  At this point, I see no reason to recommend any sort of urgent surgery.  I do think it might be worth having the patient see a gastroenterologist, given his long-standing GI complaints.  The patient certainly could have repeat CT scan or an upper GI with small bowel follow-through to try to further delineate his diagnosis, but it is certainly reasonable to try simple antibiotic treatment given the patient's current lack of insurance.  The patient understands that if he has recurrence of his symptoms, he needs to seek urgent assistance in the emergency room.  I explained to the patient that I am happy to see him back at any time if he would like further evaluation from a surgical standpoint.    Nikolas Melgoza MD  [facility]    Please route or send  letter to:  Primary Care Provider (PCP)      HPI      ROS      Physical Exam

## (undated) DEVICE — ENDO TROCAR OPTICAL 05MM VERSAPORT PLUS W/FIX CAN ONB5STF

## (undated) DEVICE — NDL INSUFFLATION 14GA STEP S100000

## (undated) DEVICE — ENDO TROCAR BLUNT 100MM W/THRD ANCHOR BLUNTPORT BPT12STS

## (undated) DEVICE — BLADE CLIPPER 3M 9670

## (undated) DEVICE — GLOVE PROTEXIS POWDER FREE SMT 7.5  2D72PT75X

## (undated) DEVICE — DRAPE LEGGINGS 8421

## (undated) DEVICE — LINEN TOWEL PACK X5 5464

## (undated) DEVICE — ESU HOLDER LAP INST DISP PURPLE LONG 330MM H-PRO-330

## (undated) DEVICE — SUCTION CANISTER MEDIVAC LINER 3000ML W/LID 65651-530

## (undated) DEVICE — SOL NACL 0.9% INJ 1000ML BAG 2B1324X

## (undated) DEVICE — CATH TRAY FOLEY 16FR DRAINAGE BAG STATLOCK 899916

## (undated) DEVICE — SU VICRYL 0 CT-2 CR 8X18" J727D

## (undated) DEVICE — GLOVE PROTEXIS POWDER FREE 8.0 ORTHOPEDIC 2D73ET80

## (undated) DEVICE — DRAPE LAVH/LAPAROSCOPY W/POUCH 29474

## (undated) DEVICE — SU VICRYL 4-0 P-3 18" UND J494G

## (undated) DEVICE — LINEN TOWEL PACK X10 5473

## (undated) DEVICE — ESU GROUND PAD ADULT W/CORD E7507

## (undated) DEVICE — SPONGE LAP 18X18" X8435

## (undated) DEVICE — BAG CLEAR TRASH 1.3M 39X33" P4040C

## (undated) DEVICE — ESU PENCIL W/HOLSTER E2350H

## (undated) DEVICE — TUBING SUCTION 12"X1/4" N612

## (undated) DEVICE — SOL ADH LIQUID BENZOIN SWAB 0.6ML C1544

## (undated) DEVICE — ESU CORD MONOPOLAR 10'  E0510

## (undated) DEVICE — SUCTION IRR STRYKERFLOW II W/TIP 250-070-520

## (undated) DEVICE — ENDO CANNULA 05MM VERSAONE UNIVERSAL UNVCA5STF

## (undated) DEVICE — DECANTER VIAL 2006S

## (undated) DEVICE — GOWN IMPERVIOUS ZONED XLG 9041

## (undated) DEVICE — SUCTION CANISTER STRAW 65652-008

## (undated) DEVICE — SOL NACL 0.9% IRRIG 1000ML BOTTLE 2F7124

## (undated) DEVICE — LINEN FULL SHEET 5511

## (undated) DEVICE — LINEN POUCH DBL 5427

## (undated) DEVICE — ENDO TROCAR 05MM VERSAONE BLADELESS W/STD FIX CAN NONB5STF

## (undated) DEVICE — ESU ELEC BLADE 2.75" COATED/INSULATED E1455

## (undated) DEVICE — Device

## (undated) DEVICE — STPL ENDO RELOAD 45MM VASCULAR MEDIUM TAN EGIA45AVM

## (undated) DEVICE — PREP CHLORAPREP 26ML TINTED ORANGE  260815

## (undated) DEVICE — LINEN HALF SHEET 5512